# Patient Record
(demographics unavailable — no encounter records)

---

## 2024-11-14 NOTE — ASSESSMENT
[FreeTextEntry1] : #xerosis cutis #retention hyperkeratosis  c/w ammonium lactate 12% lotion to dry skin on legs bid. She has not been using, resent rx to pharmacy  # Seborrheic Keratoses, clinically inflamed Natural history and treatment options discussed. Recommend treatment with cryotherapy to which the patient is agreeable.   Cryotherapy Procedure Note. Lesion(s) / Location treated: 3, neck After obtaining verbal consent, including counseling on risks of dyspigmentation and scarring, liquid nitrogen was applied to the above lesions. The patient tolerated the procedure well. Wound care was reviewed.  #Cellulitis - right lower extremity - resolved #LE wounds -resolved, NER  #SCCIS- right shin superior Right shin superior,   shave biopsy:      - Hyperplastic actinic keratosis and background stasis changes, see note. Note: Within the center of the lesion there is a small 1mm wide focus of squamous cell carcinoma in situ.   Clinical correlation is recommended. Given her age, history of issues with wound healing on lower leg, and no sign of clinical recurrence at this point, clinical monitoring is favored since foci of SCCIS was noted to be small. She is in agreement w/ plan. We discussed reasons to follow up including signs of recurrence, new bump , pain, or wound in the area of prior biopsy.   RTC prn

## 2024-11-14 NOTE — PHYSICAL EXAM
[Face] : Face [R Leg] : R Leg [L Leg] : L Leg [FreeTextEntry3] : R shin- scar, completely healed significant retention hyperkeratosis on both shins  On the anterior neck are 3x irritated stuck-on appearing papule/plaque with pseudohorn cysts.

## 2024-11-14 NOTE — HISTORY OF PRESENT ILLNESS
[FreeTextEntry1] : RPA - RLE cellulitis , dry skin, lesions on neck [de-identified] : Zahraa Jack 97 y/o F present with her son for dry skin , lesions of concern.  -raised dark lesions around neckline. occasionally becomes irritated. would like removed if possible -dry skin on legs -wounds on right leg are healed, no longer symptomatic.  ______ Zahraa Jack is a 96 y/o F presenting for F/U of the above. LV 7/24/24. With daughter.   - Healing. still open area. keeping covered w/ duoderm, vinegar soaks, and lidocaine ointment PRN.  - bump on nose -celebrated 98th birthday recently, still has upcoming birthday parties planned.

## 2024-12-03 NOTE — PROCEDURE
[Normal] : 3. No pericardial effusion. [de-identified] : Dr. Edgar Swain (card) [de-identified] : Nicole Petri-Schoener, KYLEECS [de-identified] : bilateral leg edema [de-identified] : 1.0 [de-identified] : 1.0 [de-identified] : 3.8 [de-identified] : 1.9 [de-identified] : 3.0 [de-identified] : 4.4 [de-identified] : 36 mmHg [de-identified] : 60-58 [de-identified] : Diastolic function is indeterminate [de-identified] : The left atrium is normal in size. The left atrial volume index is 24 ml/m2. [de-identified] : The aortic valve is trileaflet.  The leaflets are heavily calcified.  There is mild aortic stenosis.  The mean pressure gradient is 10 mmHg.  The peak pressure gradient is 17 mmHg.  The calculated aortic valve area is 1.3 cm2. The stroke volume index is 38 ml/m2.  The aortic valve area index is 0.7 cm2/m2. The left ventricular outflow diameter is 2.0 cm. There is aortic regurgitation. [FreeTextEntry1] : The mitral valve is normal in structure. There is mitral annular calcification. Mild mitral regurgitation. There is no mitral stenosis. [de-identified] : There is trace pulmonic insufficiency. [de-identified] : There is mild tricuspid regurgitation. [de-identified] : The inferior vena cava measures 1.3 cm. [de-identified] : Mild aortic stenosis mean gradient 10 mmHg peak gradient 17 mmHg CINDA 1.3 cm2  Mild mitral regurgitation

## 2024-12-03 NOTE — PHYSICAL EXAM
[Well Developed] : well developed [Well Nourished] : well nourished [No Acute Distress] : no acute distress [Normal Conjunctiva] : normal conjunctiva [Normal Venous Pressure] : normal venous pressure [No Carotid Bruit] : no carotid bruit [Normal S1, S2] : normal S1, S2 [No Murmur] : no murmur [No Rub] : no rub [No Gallop] : no gallop [Clear Lung Fields] : clear lung fields [Good Air Entry] : good air entry [No Respiratory Distress] : no respiratory distress  [Soft] : abdomen soft [Non Tender] : non-tender [No Masses/organomegaly] : no masses/organomegaly [Normal Bowel Sounds] : normal bowel sounds [Normal Gait] : normal gait [No Edema] : no edema [No Cyanosis] : no cyanosis [No Clubbing] : no clubbing [No Varicosities] : no varicosities [No Rash] : no rash [No Skin Lesions] : no skin lesions [Moves all extremities] : moves all extremities [No Focal Deficits] : no focal deficits [Normal Speech] : normal speech [Alert and Oriented] : alert and oriented [Normal memory] : normal memory [de-identified] : mild bilateral leg swelling sparing toes

## 2024-12-03 NOTE — ASSESSMENT
[FreeTextEntry1] : - bp acceptable for her age  - given her age i want to minimize medications as much as possible - DM intolerant of statins - had some AS on echo in the past will repeat echo  - to see pain management for neck pain may be radiculopathy its not cardiac  - fu in six months

## 2024-12-03 NOTE — HISTORY OF PRESENT ILLNESS
[FreeTextEntry1] : 98 F IDDM HPL Mild AS Statin Intolerance   c/o neck pain radiating down her left arm     ecg sr 1 avb  12/3/24 Echo EF mild AI  6/1/23

## 2024-12-19 NOTE — PHYSICAL EXAM
[FreeTextEntry1] : Gen: A+O x 3 in NAD Psych: Normal mood and affect. Responds appropriately to commands Eyes: Anicteric. No discharge. EOMI. Resp: Breathing unlabored CV: radial pulses 2+ and equal. No varicosities noted Ext: No c/c/e Skin: No lesions noted   Gait: Non antalgic +  reciprocating heel to toe able to stand on toes and heels WITH hand holding/holding onto counter with both hands Tandem gait intact WITH hand holding Romberg negative   CN2-12 grossly intact   Inspection: Spine alignment is midline, with no evidence of scoliosis. Iliac crest heights and PSIS heights level.   + Forward head position.   Palpation: There is + tenderness over the upper traps, levator scaps, rhomboids, articular pillars, cervical paraspinals, B/L There is + tenderness middle traps, latissimus dorsi, serratus anterior, B/L   Cervical ROM: Flexion, extension, side-bending, rotation, limited in most planes with pain at terminal ROM Finger to nose bilaterally intact    C5 (Shoulder Abduction)        C5 (Elbow Flex)         C6 (Wrist Ext) Right 4/5                                 5/5                           5/5      Left 4/5                                 5/5                           5/5            C7 (Elbow Ext)            C7 (Wrist Flex)             C8 (Long Finger Flex)          T1 (Finger Abduct)         Right 5/5                     5/5                                      5/5                                       5/5                                                Left 5/5                     5/5                                      4/5                                       3/5                                 C8 (Thumb Ext)            C8 & T1 (Thumb Opp)          Right 5/5                            4/5                                                Left 3/5                            4/5                             Tone: Normal. No cog wheeling. Proprioception at DIPs intact B/L Sensation: Grossly intact to light touch and pinprick bilateral upper extremities. Reflexes: 2+ symmetric biceps, pronators, brachioradialis, triceps Hoffmans Sign 3+ Spurling's Sign negative Shoulder Abduction Test (Bakody's) absent Lhermittes Sign negative

## 2024-12-19 NOTE — HISTORY OF PRESENT ILLNESS
[FreeTextEntry1] : Alex Bee M.D. Sports and Spine Medicine Department of Physical Medicine and Rehabilitation [facility] Orthopaedic [facility] [address] [facility], [address] [place] [address]   [facility] Orthopaedic [place] [facility] [address], [address] [place] [address]    For [place] Appointments Phone: [phone] Fax: [phone]   ----------------------------------------------------------------------------------------------------------------------------------------    PATIENT: CARLINE BAUTISTA MRN: [id number] YOB: 1926 DATE OF SERVICE: 12/19/2024    Dec 19, 2024    Dear Drs.    Thank you for referring CARLINE BAUTISTA to my Sports and Spine practice and office. Enclosed is a copy of the patient's consultation/progress note, which includes my complete assessment and recent studies completed during the patient's evaluation. If you have questions or have any patients who require nonsurgical, non-opiate management of any sports, spine, or musculoskeletal conditions, please do not hesitate to contact my  at [phone]. I look forward to taking care of your patients along with you.   Sincerely,   Alex Bee MD Sports, Spine, & Regenerative Musculoskeletal Medicine Orthopaedic [facility]                                                      Initial Consultation:   CC: neck pain   HPI:  This is the first visit to Orthopaedic [place] at [facility].     CARLINE BAUTISTA presents with the chief complaint as above.   Initial Hx on 12/19/2024 : Presents in person to [facility] Referred by Dr. Edgar Swain Cardiology right hand dominant one month of worsening cervical pain, left sided "lump" first noticed four months ago notes less ROM of the cervical spine over the past month as well patient notes left upper limb paresthesia, tingling  The patients difficulties began as above months ago, hand numbness started 2-3 weeks ago patient suffered fall "right on her neck" "fell on the step walking up" "very awkward position" per her recollection landed and struck her head The pain is graded as 8-10/10 The pain is described as sharp, points to the cervical spine, left region The pain is intermittent The pain does not radiate The patient feels that the pain is overall persistent Patient denies other recent fall, MVA, injury, trauma, or accident besides presenting history above   Aggravating: ambulation, prolonged sitting, prolonged standing, navigating stairs, getting out of bed, sit to stand transitional movements Alleviating: rest, activity modification, pharmacologic treatments as per intake and as above   Meds: denies regular PO pain medications; Ibuprofen 400mg  Therapy Program: no recent structured targeted therapy program HEP: doing HEP regularly; HEP consists of greater than four weeks, 5-6 days per week, 10-15 minutes per session Injection Hx: denies locally directed treatment to the area in question Imaging Hx: reviewed   Assoc Sx: Reports intermittent numbness, tingling paresthesia in the left UPPER limbs in a non-dermatomal distribution Otherwise denies numbness, Tingling Denies Focal motor weakness in the upper or lower limbs Denies New or worsened bowel or bladder incontinence Denies Saddle anesthesia Denies Using Orthotic(s)/Supportive devices Denies Swelling in the upper/lower extremities They also deny frequent tripping, falling   ROS: A 14 point review of systems was completed. Positive findings are pain as described above. The remaining systems negative.   Breast Cancer Surveillance: up to date COVID HX: reviewed    Assoc Hx: Ambulates without assistive device Level of functioning: indep with ambulation, indep with ADLs Living Situation: dwelling with steps to ente

## 2024-12-19 NOTE — ASSESSMENT
[FreeTextEntry1] :                                                       Assessment/Plan:   CARLINE BAUTISTA is a 98 year female with neck pain here for initial consultation. - Tiers of treatment and management of above diagnosis(es) were discussed with patient - Optimal diet, weight, sleep, and lifestyle management to minimize stress and maximize well being counseling provided - Imaging reviewed and discussed with patient - Reviewed previous encounter notes from 12/3/2024 Dr. LUIS Swain (Cardiology) - Patient was advised to start a structured, targeted therapy program 2-3x/wk for 8 wks with goal toward HEP - Patient was educated on an appropriate home exercise program, provided with exercise recommendations, all questions answered - Patient was advised to start Meloxicam 15 mg daily with food/milk for 5-7 days to help with pain and to decrease inflammation, afterwards as needed. Rx sent with famotidine and written instructions - Patient was advised to start gabapentin for their neuropathic pain symptoms. Patient was instructed to begin with 100mg at bedtime for the next week. If well tolerated, patient will double their nightly dose to 200mg at bedtime. After another week, if the medication is well tolerated, they will commence 300mg at bedtime. Patient provided with written instructions as well. All questions were answered and the patient displayed a clear understanding of the plan of care, including titration of gabapentin. The patient was informed about not taking this medication at the same time as any sleeping or muscle relaxant pills. Pt was also notified to stop, and contact our office, if it is too sedating, ankle swelling occurs and/or they experience suicidal thinking. - Patient may trial acetaminophen 1000mg up to TID PRN moderate to severe pain and to decrease average consumption of NSAIDs - Dec 19, 2024 EMG for left upper limb pain and paresthesia - Patient was advised to apply cool compresses or warm heat to affected regions PRN - Radiographs of cervical spine ordered and reviewed 12/19/2024    - Educated about red flag symptoms including (but not limited to) new, worsened, or persistent: fever greater than 100F, bowel or bladder incontinence, bowel obstipation, inability to void urine, urinary leakage, Severe nausea or vomiting, Worsening numbness, worsening tingling/paresthesias, and/or new or progressive motor weakness; advised to seek immediate medical attention at his nearest Emergency department should they experience any of the above    - 12/19/2024 stat MRI cervical spine without contrast is indicated given that the pt has not improved with tylenol, ibuprofen, naproxen, meloxicam, they underwent non-diagnostic radiographic imaging of the region Dec 19, 2024 spinolaminar line measurements of latearl xr from Dec 19, 2024 reveal borderline C2/3 subluxation, abn ormal appearance of obliques at that level as well, multilevel stepwise listhesis, and physical therapy/home exercise program>6 weeks. Patient's imaging is medically necessary to outline targets for locally (interventional) directed treatments and/or guide surgical management.    - Follow up in 1 week for trigger point injections   I have personally spent a total of at least 65 minutes preparing, reviewing internal and external records, explaining, counseling, providing necessary information via documented paperwork for this encounter, and coordinating care for this patient encounter.   Thank you, (s), for allowing me to participate in the care of your patient. Please do not hesitate to contact me with questions/concerns.   Alex Bee M.D. Sports and Spine Department of Physical Medicine and Rehabilitation [facility] [place] [facility] [address] [place] [address]   Appointments: [phone] Fax: [phone]

## 2024-12-19 NOTE — DATA REVIEWED
[FreeTextEntry1] : 12/19/2024  Cervical Spine XR [6 views, AP, Lateral, Left/right Obliques, Flex, Ext]:   Indication: Pain   Technique: 6 view/s of the Cervical spine.   Findings: The Cervical vertebrae are visualized from C1-C7. No acute fracture is identified.  The vertebral body and disc space heights are globally diminished C3/4 > C2/3 > C4/5 Multilevel facet degeneration noted at C3/4, C4/5 which also demonstrate prominent anterior osteophyte formation. The vertebral bodies have stepwise anterolisthesis from C2 through C6 which improves on extension, no change with flexion spinolaminar line from C1-C3 shows borderline subluxation of C2 2mm anterior The Odontoid process is intact. There is no pre-vertebral soft tissue swelling. There is loss of cervical lordosis.   Impression: MIld cervical DDD

## 2024-12-24 NOTE — ASSESSMENT
[FreeTextEntry1] :                                                       Assessment/Plan:   CARLINE BAUTISTA is a 98 year female with neck pain here for initial consultation.    - 12/24/2024 tolerated left Cervical trapezius, left levator injection well in office without complications - Tiers of treatment and management of above diagnosis(es) were discussed with patient - Optimal diet, weight, sleep, and lifestyle management to minimize stress and maximize well being counseling provided - Imaging reviewed and discussed with patient - Reviewed previous encounter notes from 12/3/2024 Dr. LUIS Swain (Cardiology) - Patient was advised to start a structured, targeted therapy program 2-3x/wk for 8 wks with goal toward HEP - Patient was educated on an appropriate home exercise program, provided with exercise recommendations, all questions answered - Patient was again advised to start Meloxicam 15 mg daily with food/milk for 5-7 days to help with pain and to decrease inflammation, afterwards as needed. Rx sent with famotidine and written instructions - Patient was again advised to start gabapentin for their neuropathic pain symptoms. Patient was instructed to begin with 100mg at bedtime for the next week. If well tolerated, patient will double their nightly dose to 200mg at bedtime. After another week, if the medication is well tolerated, they will commence 300mg at bedtime. Patient provided with written instructions as well. All questions were answered and the patient displayed a clear understanding of the plan of care, including titration of gabapentin. The patient was informed about not taking this medication at the same time as any sleeping or muscle relaxant pills. Pt was also notified to stop, and contact our office, if it is too sedating, ankle swelling occurs and/or they experience suicidal thinking. - Patient may trial acetaminophen 1000mg up to TID PRN moderate to severe pain and to decrease average consumption of NSAIDs - Dec 19, 2024 EMG for left upper limb pain and paresthesia; spoke with family and patient  - Patient was advised to apply cool compresses or warm heat to affected regions PRN - Radiographs of cervical spine ordered and reviewed 12/19/2024    - Educated about red flag symptoms including (but not limited to) new, worsened, or persistent: fever greater than 100F, bowel or bladder incontinence, bowel obstipation, inability to void urine, urinary leakage, Severe nausea or vomiting, Worsening numbness, worsening tingling/paresthesias, and/or new or progressive motor weakness; advised to seek immediate medical attention at his nearest Emergency department should they experience any of the above    - 12/19/2024 stat MRI cervical spine without contrast is indicated given that the pt has not improved with tylenol, ibuprofen, naproxen, meloxicam, they underwent non-diagnostic radiographic imaging of the region Dec 19, 2024 spinolaminar line measurements of latearl xr from Dec 19, 2024 reveal borderline C2/3 subluxation, abn ormal appearance of obliques at that level as well, multilevel stepwise listhesis, and physical therapy/home exercise program>6 weeks. Patient's imaging is medically necessary to outline targets for locally (interventional) directed treatments and/or guide surgical management.    - Follow up in 2-3 weeks after PT, after cervical MRI   I have personally spent a total of at least 45 minutes preparing, reviewing internal and external records, explaining, counseling, providing necessary information via documented paperwork for this encounter, and coordinating care for this patient encounter.   Thank you, Dr(s), for allowing me to participate in the care of your patient. Please do not hesitate to contact me with questions/concerns.   Alex Bee M.D. Sports and Spine Department of Physical Medicine and Rehabilitation [facility] Phys[facility] Orthopaedic [facility] [address], [address] [place] [address]   Appointments: [phone] Fax: [phone]

## 2024-12-24 NOTE — PROCEDURE
[de-identified] : 12/24/2024 Trigger point injections 45611 three or more muscle groups Pre/Post Procedure Diagnosis: Myofascial Pain Procedure Performed by:  Dr. Alex Bee Consent: Verbal and written informed consent was obtained/reviewed with the patient.  Risks, benefits, alternatives discussed in detail. All questions were answered. Site was then marked. Position: Sitting Anesthesia: none The skin was cleaned with alcohol Time out was then performed as per protocol. Needle used: 25 G 1.0 inch Injectate: lidocaine 1% 10mg/mL, 3mL + 0.25ml bicarbonate The following trigger points were then identified:  LEFT upper trapezius LEFT levator scapular  After negative aspiration each of these trigger points were then injected. Total volume of the injectate administered was 4 ml into each of these trigger points. EBL: less than 1 ml Complications: None Specimen: None Fluids: None Post Procedure: NRS: 1/10 Patient was observed in the room. Patient was stable upon discharge. Detailed post procedure instructions were provided. Patient to call in the event of worsening pain, fever, weakness or numbness Plan:  Further treatment will be based upon the response to the injection performed today and if found beneficial may be repeated as indicated. The patient has local pain symptoms that have persisted for more than 3 months causing tenderness and/or weakness, restricting motion and/or causing referred pain when compressed; and a taut band is palpable in an accessible muscle with exquisite tenderness at one point along its length; and the patient has been refractory or intolerant of conservative therapies such as bed rest, active exercises, ultrasound, range of motion, heating or cooling modalities, massage, and pharmacotherapies (e.g. NSAIDS), muscle relaxants, non-narcotic analgesics, and anti-depressants for a period of at least 1 month; and the TPIs are being given as part of an overall management plan. The patient reports a response to prior trigger point injections with improvement in pain and functional status with a greater than 50% pain relief for 6 weeks.  Repeat injection is being completed secondary to return of pain and deterioration in functional status. The injection is being completed to facilitate mobilization by providing pain relief and assist in application of non-invasive modalities.

## 2024-12-24 NOTE — HISTORY OF PRESENT ILLNESS
[FreeTextEntry1] : Alex Bee M.D. Sports and Spine Medicine Department of Physical Medicine and Rehabilitation [facility] Orth[facility] [address], [address] [place] [address]   [facility] [place] [facility] [address], [address] [place] [address]    For [place] Appointments Phone: [phone] Fax: [phone]   ----------------------------------------------------------------------------------------------------------------------------------------    PATIENT: CARLINE BAUTISTA MRN: [id number] YOB: 1926 DATE OF SERVICE: Dec 24, 2024    Dec 24, 2024      Dear Drs.    Thank you for referring CARLINE BAUTISTA to my Sports and Spine practice and office. Enclosed is a copy of the patient's consultation/progress note, which includes my complete assessment and recent studies completed during the patient's evaluation. If you have questions or have any patients who require nonsurgical, non-opiate management of any sports, spine, or musculoskeletal conditions, please do not hesitate to contact my  at [phone]. I look forward to taking care of your patients along with you.   Sincerely,   Alex Bee MD Sports, Spine, & Regenerative Musculoskeletal Medicine Orthopaedic [facility]                                                      Initial Consultation:   CC: neck pain   HPI:  This is the first visit to [facility] [place] [facility].     CARLINE BAUTISTA presents with the chief complaint as above.   Interval Hx on Dec 24, 2024: presents for follow up. At the last visit, we advised starting 3 days anti-inflammatory, start gabapentin at bedtime, advised MRI cervical spine, recommended EMG, advised waiting until after MRi and local treatment to begin PT program, and suggested follow up in 1 week for cervical TPI. Since the last visit, patient has continued to have pain and tightness over the upper limb, lower cervical upper thoracic region. There have been no significant changes to their aggravating or alleviating factors since the last visit. Since the last visit, they relate significant improvements in pain previously associated with known exacerbating, aggravating factors and situations. Pharmacologic treatments now include OTC analgesics PRN, but otherwise pharmacologic treatments are minimal. Denies new or worsened numbness, tingling, or focal motor deficit. Denies interval fall, accident, or injury.    Meds: denies regular PO pain medications; Ibuprofen 400mg  Therapy Program: no recent structured targeted therapy program HEP: doing HEP regularly; HEP consists of greater than four weeks, 5-6 days per week, 10-15 minutes per session Injection Hx: denies locally directed treatment to the area in question Imaging Hx: reviewed   Assoc Sx: Reports intermittent numbness, tingling paresthesia in the left UPPER limbs in a non-dermatomal distribution Otherwise denies numbness, Tingling Denies Focal motor weakness in the upper or lower limbs Denies New or worsened bowel or bladder incontinence Denies Saddle anesthesia Denies Using Orthotic(s)/Supportive devices Denies Swelling in the upper/lower extremities They also deny frequent tripping, falling   ROS: A 14 point review of systems was completed. Positive findings are pain as described above. The remaining systems negative.   Initial Hx on 12/19/2024: Presents in person to [facility]. Referred by Dr. Edgar Swain Cardiology. right hand dominant. one month of worsening cervical pain, left sided "lump" first noticed four months ago. notes less ROM of the cervical spine over the past month as well. patient notes left upper limb paresthesia, tingling. The patients difficulties began as above months ago, hand numbness started 2-3 weeks ago. patient suffered fall "right on her neck" "fell on the step walking up" "very awkward position" per her recollection landed and struck her head. The pain is graded as 8-10/10. The pain is described as sharp, points to the cervical spine, left region. The pain is intermittent. The pain does not radiate. The patient feels that the pain is overall persistent. Patient denies other recent fall, MVA, injury, trauma, or accident besides presenting history above. Aggravating: ambulation, prolonged sitting, prolonged standing, navigating stairs, getting out of bed, sit to stand transitional movements Alleviating: rest, activity modification, pharmacologic treatments as per intake and as above  Breast Cancer Surveillance: up to date COVID HX: reviewed    Assoc Hx: Ambulates without assistive device Level of functioning: indep with ambulation, indep with ADLs Living Situation: dwelling with steps to ente

## 2025-01-08 NOTE — PROCEDURE
[de-identified] : 12/24/2024 Trigger point injections 05481 three or more muscle groups Pre/Post Procedure Diagnosis: Myofascial Pain Procedure Performed by:  Dr. Alex Bee Consent: Verbal and written informed consent was obtained/reviewed with the patient.  Risks, benefits, alternatives discussed in detail. All questions were answered. Site was then marked. Position: Sitting Anesthesia: none The skin was cleaned with alcohol Time out was then performed as per protocol. Needle used: 25 G 1.0 inch Injectate: lidocaine 1% 10mg/mL, 3mL + 0.25ml bicarbonate The following trigger points were then identified:  LEFT upper trapezius LEFT levator scapular  After negative aspiration each of these trigger points were then injected. Total volume of the injectate administered was 4 ml into each of these trigger points. EBL: less than 1 ml Complications: None Specimen: None Fluids: None Post Procedure: NRS: 1/10 Patient was observed in the room. Patient was stable upon discharge. Detailed post procedure instructions were provided. Patient to call in the event of worsening pain, fever, weakness or numbness Plan:  Further treatment will be based upon the response to the injection performed today and if found beneficial may be repeated as indicated. The patient has local pain symptoms that have persisted for more than 3 months causing tenderness and/or weakness, restricting motion and/or causing referred pain when compressed; and a taut band is palpable in an accessible muscle with exquisite tenderness at one point along its length; and the patient has been refractory or intolerant of conservative therapies such as bed rest, active exercises, ultrasound, range of motion, heating or cooling modalities, massage, and pharmacotherapies (e.g. NSAIDS), muscle relaxants, non-narcotic analgesics, and anti-depressants for a period of at least 1 month; and the TPIs are being given as part of an overall management plan. The patient reports a response to prior trigger point injections with improvement in pain and functional status with a greater than 50% pain relief for 6 weeks.  Repeat injection is being completed secondary to return of pain and deterioration in functional status. The injection is being completed to facilitate mobilization by providing pain relief and assist in application of non-invasive modalities.

## 2025-01-08 NOTE — DATA REVIEWED
[FreeTextEntry1] : SITE PERFORMED: [facility] SITE PHONE: [phone] Patient: CARLINE BAUTISTA YOB: 1926 Phone: [phone] MRN: [id number] Acc: [id number] Date of Exam: 12- EXAM:  MRI CERVICAL SPINE WITHOUT CONTRAST HISTORY:  Neck pain. TECHNIQUE:  Multiplanar, multi-sequential MRI of the cervical spine was obtained on a 1.2T scanner using a standard protocol. COMPARISON:  None FINDINGS:  ALIGNMENT: There is mild retrolisthesis at C3-4 and C4-5. There is grade 1 anterolisthesis at C6-7 and C7-T1. OSSEOUS STRUCTURES: Vertebral body heights are preserved. No destructive marrow infiltrative process. SPINAL CORD: Normal signal. POSTERIOR FOSSA/CERVICOMEDULLARY JUNCTION: There is partially visualized moderate atrophy of the visualized brain. NECK/PARASPINAL SOFT TISSUES: There are partially visualized polyps in the left maxillary sinus. INCLUDED THORACIC SPINE: There is partially visualized mild degenerative disc disease in the thoracic spine. DISCS: There is multilevel disc desiccation and moderate multilevel disc space narrowing. The following axial levels are imaged and detailed below: C2-C3: Fusion of the right facet joint. Mild left facet joint arthrosis. No disc herniation, canal stenosis, or foraminal narrowing. C3-C4: Broad-based disc ridge complex and ligamentum flavum hypertrophy mildly indent the spinal cord and cause mild to moderate canal stenosis without cord signal abnormality. Left greater than right uncovertebral hypertrophy and facet arthrosis cause mild right and mild to moderate left foraminal narrowing. C4-C5: Broad-based disc ridge complex and ligamentum flavum hypertrophy contact the spinal cord and cause mild canal stenosis without cord signal abnormality. Bilateral uncovertebral hypertrophy and facet arthrosis cause mild bilateral foraminal narrowing. C5-C6: Broad-based disc ridge complex and ligamentum flavum hypertrophy indent the thecal sac without contacting the spinal cord. No canal stenosis. Bilateral uncovertebral hypertrophy and facet arthrosis cause moderate bilateral foraminal narrowing. C6-C7: Broad-based disc ridge complex indents the ventral thecal sac without contacting the spinal cord. No canal stenosis. Bilateral uncovertebral hypertrophy and facet arthrosis without foraminal narrowing. C7-T1: Broad-based disc ridge complex and mild to moderate bilateral facet arthrosis. No canal stenosis or foraminal narrowing. IMPRESSION:  MRI of the cervical spine demonstrates: 1.  Multilevel spondylosis with mild to moderate canal stenosis at C3-4 and mild canal stenosis at C4-5 2.  Multilevel foraminal narrowing, which is moderate bilaterally at C5-6.  12/19/2024 Cervical Spine XR [6 views, AP, Lateral, Left/right Obliques, Flex, Ext]: ndication: Pain Technique: 6 view/s of the Cervical spine. Findings: The Cervical vertebrae are visualized from C1-C7. No acute fracture is identified.  The vertebral body and disc space heights are globally diminished C3/4 > C2/3 > C4/5 Multilevel facet degeneration noted at C3/4, C4/5 which also demonstrate prominent anterior osteophyte formation. The vertebral bodies have stepwise anterolisthesis from C2 through C6 which improves on extension, no change with flexion spinolaminar line from C1-C3 shows borderline subluxation of C2 2mm anterior The Odontoid process is intact. There is no pre-vertebral soft tissue swelling. There is loss of cervical lordosis.   Impression: MIld cervical DDD

## 2025-01-08 NOTE — ASSESSMENT
[FreeTextEntry1] :                                                       Assessment/Plan:   CARLINE BAUTISTA is a 98 year female with neck pain here for initial consultation.    - 12/24/2024 tolerated left Cervical trapezius, left levator injection well in office without complications - Tiers of treatment and management of above diagnosis(es) were discussed with patient - Optimal diet, weight, sleep, and lifestyle management to minimize stress and maximize well being counseling provided - Imaging reviewed and discussed with patient - Reviewed previous encounter notes from 12/3/2024 Dr. LUIS Swain (Cardiology) - Patient was advised to start a structured, targeted therapy program 2-3x/wk for 8 wks with goal toward HEP - Patient was educated on an appropriate home exercise program, provided with exercise recommendations, all questions answered - Patient was again advised to start Meloxicam 15 mg daily with food/milk for 5-7 days to help with pain and to decrease inflammation, afterwards as needed. Rx sent with famotidine and written instructions; On Jan 08, 2025 advised stopping meloxicam - On Jan 08, 2025, advised to stop gabapentin after 2 weeks of no relief - Patient may trial acetaminophen 1000mg up to TID PRN moderate to severe pain and to decrease average consumption of NSAIDs - Dec 19, 2024 EMG for left upper limb pain and paresthesia; spoke with family and patient  - Patient was advised to apply cool compresses or warm heat to affected regions PRN - Radiographs of cervical spine ordered and reviewed 12/19/2024, reviewed including Jan 08, 2025  - Jan 08, 2025 referral for Anesthesia Pain consultation     - Educated about red flag symptoms including (but not limited to) new, worsened, or persistent: fever greater than 100F, bowel or bladder incontinence, bowel obstipation, inability to void urine, urinary leakage, Severe nausea or vomiting, Worsening numbness, worsening tingling/paresthesias, and/or new or progressive motor weakness; advised to seek immediate medical attention at his nearest Emergency department should they experience any of the above    - 12/19/2024 stat MRI cervical spine without contrast is indicated given that the pt has not improved with tylenol, ibuprofen, naproxen, meloxicam, they underwent non-diagnostic radiographic imaging of the region Dec 19, 2024 spinolaminar line measurements of latearl xr from Dec 19, 2024 reveal borderline C2/3 subluxation, abn ormal appearance of obliques at that level as well, multilevel stepwise listhesis, and physical therapy/home exercise program>6 weeks. Patient's imaging is medically necessary to outline targets for locally (interventional) directed treatments and/or guide surgical management.    - Follow up in 4-6 weeks after Anesthesia Pain Consultation, may repeat trigger point injections   I have personally spent a total of at least 45 minutes preparing, reviewing internal and external records, explaining, counseling, providing necessary information via documented paperwork for this encounter, and coordinating care for this patient encounter. inlcuding 7 minutes waiting for Teams Solo log in and 16 minutes telephone call from [facility] workspace room   Thank you, Dr(s), for allowing me to participate in the care of your patient. Please do not hesitate to contact me with questions/concerns.   Alex Bee M.D. Sports and Spine Department of Physical Medicine and Rehabilitation [facility] Physician [facility] Orthopaedic [facility] [address] [place] [address]   Appointments: [phone] Fax: [phone]

## 2025-01-08 NOTE — REASON FOR VISIT
[Home] : at home, [unfilled] , at the time of the visit. [Medical Office: ([facility])___] : at the medical office located in  [Patient] : the patient [This encounter was initiated by telehealth (audio with video) and converted to telephone (audio only) due to technical difficulties.] : This encounter was initiated by telehealth (audio with video) and converted to telephone (audio only) due to technical difficulties. [Follow-Up] : a follow-up visit

## 2025-01-08 NOTE — HISTORY OF PRESENT ILLNESS
[FreeTextEntry1] : Alex Bee M.D. Sports and Spine Medicine Department of Physical Medicine and Rehabilitation [facility] Orthopaedic [facility] [address] [place] [address]   [facility] Orthopaedic [place] at [facility] [address], [address] [place] [address]    For [place] Appointments Phone: [phone] Fax: [phone]   ----------------------------------------------------------------------------------------------------------------------------------------    PATIENT: CARLINE BAUTISTA MRN: [id number] YOB: 1926 DATE OF SERVICE: Jan 08, 2025 Jan 08, 2025     Dear Drs.    Thank you for referring CARLINE BAUTISTA to my Sports and Spine practice and office. Enclosed is a copy of the patient's consultation/progress note, which includes my complete assessment and recent studies completed during the patient's evaluation. If you have questions or have any patients who require nonsurgical, non-opiate management of any sports, spine, or musculoskeletal conditions, please do not hesitate to contact my  at [phone]. I look forward to taking care of your patients along with you.   Sincerely,   Alex Bee MD Sports, Spine, & Regenerative Musculoskeletal Medicine Orthopaedic [facility]                                                      Follow Up Visit CC: neck pain   HPI:  This is a follow up visit to Orthopaedic [place] at [facility] Sports Medicine [facility] Spine Practice.     CARLINE BAUTISTA presents with the chief complaint as above.  Interval Hx on Jan 08, 2025: presents for follow up. At the last visit, we advised follow up 2-3 weeks after MRI, and on 12/24/2024 administered left upper trap, left levator scap trigger point injection. MRi was requested for due to XR with spinolaminar line measurements of latearl xr from Dec 19, 2024 reveal borderline C2/3 subluxation, abnormal appearance of obliques at that level as well, multilevel stepwise listhesis. Since the last visit, patient has obtained MRI 12/28/2024 and presents for follow up. Patient informed of all relevant imaging findings, all questions were answered including fusion of C2/3 whch had caused the XR abnormality, moderate central canal stenosis C3/4, mild C4/5 in context of multilevel foraminal stenoses. had good relief for 3 days after trigger point injections. less pain overall with left sided trigger point injections, better range of motion. her Am glucose 387 after her first day of 300mg gabapentin at bedtime. There have been no significant changes to their aggravating or alleviating factors since the last visit. Since the last visit, they relate significant improvements in pain previously associated with known exacerbating, aggravating factors and situations. Pharmacologic treatments now include OTC analgesics PRN, but otherwise pharmacologic treatments are minimal. Given dearth of symptoms, pharmacologic treatments are now minimal. Denies new or worsened numbness, tingling, or focal motor deficit. Denies interval fall, accident, or injury. Denies change in bowel or bladder functioning.   Interval Hx on Dec 24, 2024: presents for follow up. At the last visit, we advised starting 3 days anti-inflammatory, start gabapentin at bedtime, advised MRI cervical spine, recommended EMG, advised waiting until after MRi and local treatment to begin PT program, and suggested follow up in 1 week for cervical TPI. Since the last visit, patient has continued to have pain and tightness over the upper limb, lower cervical upper thoracic region. There have been no significant changes to their aggravating or alleviating factors since the last visit. Since the last visit, they relate significant improvements in pain previously associated with known exacerbating, aggravating factors and situations. Pharmacologic treatments now include OTC analgesics PRN, but otherwise pharmacologic treatments are minimal. Denies new or worsened numbness, tingling, or focal motor deficit. Denies interval fall, accident, or injury.    Meds: denies regular PO pain medications; Ibuprofen 400mg  Therapy Program: no recent structured targeted therapy program HEP: doing HEP regularly; HEP consists of greater than four weeks, 5-6 days per week, 10-15 minutes per session Injection Hx: denies locally directed treatment to the area in question Imaging Hx: reviewed   Assoc Sx: Reports intermittent numbness, tingling paresthesia in the left UPPER limbs in a non-dermatomal distribution Otherwise denies numbness, Tingling Denies Focal motor weakness in the upper or lower limbs Denies New or worsened bowel or bladder incontinence Denies Saddle anesthesia Denies Using Orthotic(s)/Supportive devices Denies Swelling in the upper/lower extremities They also deny frequent tripping, falling   ROS: A 14 point review of systems was completed. Positive findings are pain as described above. The remaining systems negative.   Initial Hx on 12/19/2024: Presents in person to [facility]. Referred by Dr. Edgar Swain Cardiology. right hand dominant. one month of worsening cervical pain, left sided "lump" first noticed four months ago. notes less ROM of the cervical spine over the past month as well. patient notes left upper limb paresthesia, tingling. The patients difficulties began as above months ago, hand numbness started 2-3 weeks ago. patient suffered fall "right on her neck" "fell on the step walking up" "very awkward position" per her recollection landed and struck her head. The pain is graded as 8-10/10. The pain is described as sharp, points to the cervical spine, left region. The pain is intermittent. The pain does not radiate. The patient feels that the pain is overall persistent. Patient denies other recent fall, MVA, injury, trauma, or accident besides presenting history above. Aggravating: ambulation, prolonged sitting, prolonged standing, navigating stairs, getting out of bed, sit to stand transitional movements Alleviating: rest, activity modification, pharmacologic treatments as per intake and as above  Breast Cancer Surveillance: up to date COVID HX: reviewed    Assoc Hx: Ambulates without assistive device Level of functioning: indep with ambulation, indep with ADLs Living Situation: dwelling with steps to ente

## 2025-01-14 NOTE — REVIEW OF SYSTEMS
[Neck Pain] : neck pain [Joint Pain] : joint pain [Decreased ROM] : decreased range of motion [Negative] : Constitutional

## 2025-01-17 NOTE — ASSESSMENT
[FreeTextEntry1] : Patient is a 98 year old female, [facility] Bee[facility] presenting today for initial consultation for chronic neck pain x 6 months.  Today, she reports the neck pain is rated a 7/10. The pain is described as an achy pain felt in the neck, radiates up to the base of the head and lower in the neck. She reports radiating pain to the left hand and fingertips. It is exacerbated with activity, prolonged sitting, turning her head. It is improved with heat and warm shower.  She has tried multiple conservative therapies including, rest, HEP, PT, TPIs, meloxicam, ibuprofen 400 mg BID, gabapentin (increase glucose levels), tylenol, although symptoms persist.  Plan: - Discussed CHIRST vs CMBB needles would be in the area sensitive to touch and ttp to light touch. Patient with T2DM on insulin and not recommended.  - Recommend Lidocaine 5% ointment alternating with Diclofenac topical ointment. Or may use Lidocaine 4% patch.  - Recommend Tylenol 500 mg TID alternating with Ibuprofen 400 mg  - Follow up in 2 weeks

## 2025-01-17 NOTE — HISTORY OF PRESENT ILLNESS
[FreeTextEntry1] : Patient is a 98 year old female, [facility] presenting today for initial consultation for chronic neck pain x 6 months.   Today, she reports the neck pain is rated a 7/10. The pain is described as an achy pain felt in the neck, radiates up to the base of the head and lower in the neck. She reports radiating pain to the left hand and fingertips. It is exacerbated with activity, prolonged sitting, turning her head. It is improved with heat and warm shower.   She has tried multiple conservative therapies including, rest, HEP, PT, TPIs, meloxicam, ibuprofen 400 mg BID, gabapentin (increase glucose levels), tylenol, although symptoms persist.

## 2025-01-17 NOTE — PHYSICAL EXAM
[Normal] : Regular, no tachycardia [Normal muscle bulk without asymmetry] : normal muscle bulk without asymmetry [Spinous Process Tenderness] : spinous process tenderness [Facet Tenderness] : facet tenderness [Paraspinal Tenderness] : paraspinal tenderness [Spurling] : positive Spurling's Test [Urbina's Sign] : positive Urbina's Sign [UE] : Sensory: Intact in bilateral upper extremities [Bicep] : biceps 2+ and symmetric bilaterally [de-identified] : kyphotic and extended neck. TTP to posterior and left lateral neck.  [de-identified] : limited rom on cervical spine lateral rotation, more limited turning to the right, also on extension and flexion

## 2025-01-17 NOTE — HISTORY OF PRESENT ILLNESS
[FreeTextEntry1] : Patient is a 98 year old female, [facility], presenting today for initial consultation for chronic neck pain x 6 months.   Today, she reports the neck pain is rated a 7/10. The pain is described as an achy pain felt in the neck, radiates up to the base of the head and lower in the neck. She reports radiating pain to the left hand and fingertips. It is exacerbated with activity, prolonged sitting, turning her head. It is improved with heat and warm shower.   She has tried multiple conservative therapies including, rest, HEP, PT, TPIs, meloxicam, ibuprofen 400 mg BID, gabapentin (increase glucose levels), tylenol, although symptoms persist.

## 2025-01-17 NOTE — ASSESSMENT
[FreeTextEntry1] : Patient is a 98 year old female, referred by Dr. Bee, [facility] of [facility] (on insulin), presenting today for initial consultation for chronic neck pain x 6 months.  Today, she reports the neck pain is rated a 7/10. The pain is described as an achy pain felt in the neck, radiates up to the base of the head and lower in the neck. She reports radiating pain to the left hand and fingertips. It is exacerbated with activity, prolonged sitting, turning her head. It is improved with heat and warm shower.  She has tried multiple conservative therapies including, rest, HEP, PT, TPIs, meloxicam, ibuprofen 400 mg BID, gabapentin (increase glucose levels), tylenol, although symptoms persist.  Plan: - Discussed [facility]RIST vs CMBB needles would be in the area sensitive to touch and ttp to light touch. Patient with T2DM on insulin and not recommended.  - Recommend Lidocaine 5% ointment alternating with Diclofenac topical ointment. Or may use Lidocaine 4% patch.  - Recommend Tylenol 500 mg TID alternating with Ibuprofen 400 mg  - Follow up in 2 weeks

## 2025-01-17 NOTE — DATA REVIEWED
[FreeTextEntry1] : Date of Exam: 12- EXAM: MRI CERVICAL SPINE WITHOUT CONTRAST HISTORY: Neck pain. TECHNIQUE: Multiplanar, multi-sequential MRI of the cervical spine was obtained on a 1.2T scanner using a standard protocol. COMPARISON: None FINDINGS: ALIGNMENT: There is mild retrolisthesis at C3-4 and C4-5. There is grade 1 anterolisthesis at C6-7 and C7-T1. OSSEOUS STRUCTURES: Vertebral body heights are preserved. No destructive marrow infiltrative process. SPINAL CORD: Normal signal. POSTERIOR FOSSA/CERVICOMEDULLARY JUNCTION: There is partially visualized moderate atrophy of the visualized brain. NECK/PARASPINAL SOFT TISSUES: There are partially visualized polyps in the left maxillary sinus. INCLUDED THORACIC SPINE: There is partially visualized mild degenerative disc disease in the thoracic spine. DISCS: There is multilevel disc desiccation and moderate multilevel disc space narrowing. The following axial levels are imaged and detailed below: C2-C3: Fusion of the right facet joint. Mild left facet joint arthrosis. No disc herniation, canal stenosis, or foraminal narrowing. C3-C4: Broad-based disc ridge complex and ligamentum flavum hypertrophy mildly indent the spinal cord and cause mild to moderate canal stenosis without cord signal abnormality. Left greater than right uncovertebral hypertrophy and facet arthrosis cause mild right and mild to moderate left foraminal narrowing. C4-C5: Broad-based disc ridge complex and ligamentum flavum hypertrophy contact the spinal cord and cause mild canal stenosis without cord signal abnormality. Bilateral uncovertebral hypertrophy and facet arthrosis cause mild bilateral foraminal narrowing. C5-C6: Broad-based disc ridge complex and ligamentum flavum hypertrophy indent the thecal sac without contacting the spinal cord. No canal stenosis. Bilateral uncovertebral hypertrophy and facet arthrosis cause moderate bilateral foraminal narrowing. C6-C7: Broad-based disc ridge complex indents the ventral thecal sac without contacting the spinal cord. No canal stenosis. Bilateral uncovertebral hypertrophy and facet arthrosis without foraminal narrowing. C7-T1: Broad-based disc ridge complex and mild to moderate bilateral facet arthrosis. No canal stenosis or foraminal narrowing. IMPRESSION: MRI of the cervical spine demonstrates: 1. Multilevel spondylosis with mild to moderate canal stenosis at C3-4 and mild canal stenosis at C4-5 2. Multilevel foraminal narrowing, which is moderate bilaterally at C5-6.  12/19/2024 Cervical Spine XR [6 views, AP, Lateral, Left/right Obliques, Flex, Ext]: ndication: Pain Technique: 6 view/s of the Cervical spine. Findings: The Cervical vertebrae are visualized from C1-C7. No acute fracture is identified. The vertebral body and disc space heights are globally diminished C3/4 > C2/3 > C4/5 Multilevel facet degeneration noted at C3/4, C4/5 which also demonstrate prominent anterior osteophyte formation. The vertebral bodies have stepwise anterolisthesis from C2 through C6 which improves on extension, no change with flexion spinolaminar line from C1-C3 shows borderline subluxation of C2 2mm anterior The Odontoid process is intact. There is no pre-vertebral soft tissue swelling. There is loss of cervical lordosis.  Impression: MIld cervical DDD

## 2025-03-12 NOTE — PHYSICAL EXAM
[Face] : Face [R Leg] : R Leg [L Leg] : L Leg [FreeTextEntry3] :  On the R anterior neck, R cheek, L posterior shoulder are 4x irritated stuck-on appearing papules with pseudohorn cysts.

## 2025-03-12 NOTE — ASSESSMENT
[FreeTextEntry1] : Seborrheic Keratoses, clinically inflamed Natural history and treatment options discussed. Recommend treatment with cryotherapy to which the patient is agreeable.   Cryotherapy Procedure Note. Lesion(s) / Location treated: 4;  R anterior neck (2), R cheek, L posterior shoulder After obtaining verbal consent, including counseling on risks of dyspigmentation and scarring, liquid nitrogen was applied to the above lesions. The patient tolerated the procedure well. Wound care was reviewed.  #Cellulitis - right lower extremity - resolved #LE wounds  #SCCIS- right shin superior Right shin superior,   shave biopsy:      - Hyperplastic actinic keratosis and background stasis changes, see note. Note: Within the center of the lesion there is a small 1mm wide focus of squamous cell carcinoma in situ.   Clinical correlation is recommended. Given her issues with wound healing on lower leg, and no sign of clinical recurrence at this point, clinical monitoring is favored since foci of SCCIS was noted to be small. She is in agreement w/ plan. We discussed reasons to follow up including signs of recurrence, new bump , pain, or wound in the area of prior biopsy.   #xerosis cutis c/w ammonium lactate 12% lotion to dry skin on legs bid  RTC prn

## 2025-03-12 NOTE — HISTORY OF PRESENT ILLNESS
[FreeTextEntry1] : RPA - Growths [de-identified] : Zahraa Jack is a 96 y/o F presents for growths on face and neck. with son.   Raised bumps on L posterior shoulder, R cheek that get irritated.  2 SK from  on right neck still there. Occasionally irritated.

## 2025-04-17 NOTE — PROCEDURE
[FreeTextEntry1] : My impression is that the patient has stenosing tenosynovitis of the right thumb. We discussed treatment options and she elected to undergo a trigger thumb injection. The risks, benefits, and alternatives were discussed with the patient. This included, but was not limited to nerve injury, infection, subcutaneous atrophy, skin depigmentation etc. Under informed consent and sterile conditions the flexor tendon sheath at the A1 pulley was injected with a combination of 1/2 cc Kenalog-10 and 1/2 cc of 2% plain lidocaine. It is my hopes that this significantly alleviates the patients symptoms.  My impression is that the patient has basal joint osteoarthritis. We discussed treatment options and she elected to undergo a right basal joint injection. The risks, benefits, and alternatives were discussed with the patient. This included, but was not limited to nerve injury, infection, subcutaneous atrophy, skin depigmentation etc. Under informed consent and sterile conditions the basal joint was injected with a combination of 1/2 cc Kenalog-10 and 1/2 cc of 2% plain lidocaine. It is my hopes that this significantly alleviates the patients symptoms.

## 2025-04-17 NOTE — PHYSICAL EXAM
[de-identified] : Physical exam demonstrates the patient to be alert and oriented x 3 and capable of ambulation. The patient is well-developed and well-nourished in no apparent respiratory distress. The majority of the skin is intact bilaterally in the upper extremities without any bilateral elbow lymphadenopathy.  The wrists have symmetric range of motion bilaterally. There is bony prominence at the base of bilateral thumbs. There is tenderness to palpation at the right thumb CMC joint. Positive grind test to pain and crepitus. There is no tenderness over the scaphoid, scapholunate, or lunotriquetral ligaments bilaterally. There is a negative Coleman's test bilaterally. There is no tenderness over the distal radial ulnar joint or TFCC and no evidence of instability bilaterally. There is no tenderness over the pisotriquetral joint, hamate hook, or CMC joints bilaterally. The patient is nontender over both scaphoids and anatomic snuffbox is bilaterally. There is no clubbing cyanosis or edema.  Near symmetric digital range of motion. There are Heberden's and Laura's nodes present over bilateral hands. There is tenderness to palpation over the right thumb A1 pulley. Minimally tender over the right thumb MCP joint. There is good capillary refill of the digits bilaterally. Sensation is intact to light touch bilaterally.

## 2025-04-17 NOTE — ASSESSMENT
[FreeTextEntry1] : My impression is the patient has a right trigger thumb.  Initial treatment options were discussed and shared decision making was made to proceed with a corticosteroid injection into the FPL tendon sheath today.  I explained that recurrence of symptoms is not uncommon.  If this were to happen, consideration for another injection can be made.  I did note that multiple, repeated injections is not recommended and surgery should be considered for persistently recurrent symptoms.    My impression is that the patient has right thumb basal joint arthritis. Initial treatment options were discussed. I explained that the condition is not curative and that initial treatment his pain to help control her symptoms. I discussed activity modification, diminishing activities that exacerbate her symptoms (i.e. opening tight jars and bottle caps), anti-inflammatory medication/ointment, thumb spica splint wear and corticosteroid injection. The patient elected to undergo a steroid shot today and tolerated it well. I explained it may take up to one to 2 weeks for the steroid to take effect. I did note that recurrence of symptoms is not uncommon. If there is persistence of significant symptoms over a period of at least 6 months, despite conservative treatment, consideration for surgical intervention will be made. All questions were answered and she will follow up with me on an as-needed basis.

## 2025-04-17 NOTE — HISTORY OF PRESENT ILLNESS
[FreeTextEntry1] : 98-year-old female with past medical history of type 2 diabetes and osteoarthritis presents for evaluation of atraumatic right base of thumb pain and stiffness that started last week. The patient states that she might have overused the right thumb while at PT for her shoulder. The patient has not tried any therapy, splint wear or injections for her symptoms. The patient has tried a course of anti-inflammatory medication with some relief of symptoms.

## 2025-05-08 NOTE — PROCEDURE
[Normal] : 3. No pericardial effusion. [de-identified] : Dr. Edgar Swain (card) [de-identified] : Nicole Petri-Schoener, KYLEECS [de-identified] : bilateral leg edema [de-identified] : 1.0 [de-identified] : 1.0 [de-identified] : 3.8 [de-identified] : 1.9 [de-identified] : 3.0 [de-identified] : 4.4 [de-identified] : 36 mmHg [de-identified] : 60-66 [de-identified] : Diastolic function is indeterminate [de-identified] : The left atrium is normal in size. The left atrial volume index is 24 ml/m2. [de-identified] : The aortic valve is trileaflet.  The leaflets are heavily calcified.  There is mild aortic stenosis.  The mean pressure gradient is 10 mmHg.  The peak pressure gradient is 17 mmHg.  The calculated aortic valve area is 1.3 cm2. The stroke volume index is 38 ml/m2.  The aortic valve area index is 0.7 cm2/m2. The left ventricular outflow diameter is 2.0 cm. There is aortic regurgitation. [FreeTextEntry1] : The mitral valve is normal in structure. There is mitral annular calcification. Mild mitral regurgitation. There is no mitral stenosis. [de-identified] : There is trace pulmonic insufficiency. [de-identified] : There is mild tricuspid regurgitation. [de-identified] : The inferior vena cava measures 1.3 cm. [de-identified] : Mild aortic stenosis mean gradient 10 mmHg peak gradient 17 mmHg CINDA 1.3 cm2  Mild mitral regurgitation

## 2025-05-08 NOTE — PHYSICAL EXAM
[Well Developed] : well developed [Well Nourished] : well nourished [No Acute Distress] : no acute distress [Normal Conjunctiva] : normal conjunctiva [Normal Venous Pressure] : normal venous pressure [No Carotid Bruit] : no carotid bruit [Normal S1, S2] : normal S1, S2 [No Murmur] : no murmur [No Rub] : no rub [No Gallop] : no gallop [Clear Lung Fields] : clear lung fields [Good Air Entry] : good air entry [No Respiratory Distress] : no respiratory distress  [Soft] : abdomen soft [Non Tender] : non-tender [No Masses/organomegaly] : no masses/organomegaly [Normal Bowel Sounds] : normal bowel sounds [Normal Gait] : normal gait [No Edema] : no edema [No Cyanosis] : no cyanosis [No Clubbing] : no clubbing [No Varicosities] : no varicosities [No Rash] : no rash [No Skin Lesions] : no skin lesions [Moves all extremities] : moves all extremities [No Focal Deficits] : no focal deficits [Normal Speech] : normal speech [Alert and Oriented] : alert and oriented [Normal memory] : normal memory [de-identified] : mild bilateral leg swelling sparing toes

## 2025-05-08 NOTE — HISTORY OF PRESENT ILLNESS
[FreeTextEntry1] : 98 F IDDM HPL Mild AS Statin Intolerance   c/o neck pain radiating down her left arm     ecg sr 1 avb  12/3/24 Echo EF mild AI  12/13/25